# Patient Record
Sex: MALE | Race: OTHER | HISPANIC OR LATINO | URBAN - METROPOLITAN AREA
[De-identification: names, ages, dates, MRNs, and addresses within clinical notes are randomized per-mention and may not be internally consistent; named-entity substitution may affect disease eponyms.]

---

## 2022-01-17 ENCOUNTER — EMERGENCY (EMERGENCY)
Facility: HOSPITAL | Age: 47
LOS: 1 days | Discharge: HOME | End: 2022-01-17
Attending: STUDENT IN AN ORGANIZED HEALTH CARE EDUCATION/TRAINING PROGRAM
Payer: SELF-PAY

## 2022-01-17 VITALS
OXYGEN SATURATION: 99 % | TEMPERATURE: 98 F | RESPIRATION RATE: 18 BRPM | HEIGHT: 68 IN | SYSTOLIC BLOOD PRESSURE: 135 MMHG | DIASTOLIC BLOOD PRESSURE: 83 MMHG | HEART RATE: 76 BPM | WEIGHT: 179.9 LBS

## 2022-01-17 PROCEDURE — 99283 EMERGENCY DEPT VISIT LOW MDM: CPT

## 2022-01-17 RX ORDER — AZTREONAM 2 G
1 VIAL (EA) INJECTION
Qty: 20 | Refills: 0
Start: 2022-01-17 | End: 2022-01-26

## 2022-01-17 RX ORDER — ACETAMINOPHEN 500 MG
975 TABLET ORAL ONCE
Refills: 0 | Status: COMPLETED | OUTPATIENT
Start: 2022-01-17 | End: 2022-01-17

## 2022-01-17 RX ADMIN — Medication 975 MILLIGRAM(S): at 14:24

## 2022-01-17 RX ADMIN — Medication 1 TABLET(S): at 14:23

## 2022-01-17 NOTE — ED ADULT NURSE NOTE - CAS DISCH ACCOMP BY
Therapy orders received pt is planned to have surgery will hold current orders and will need new post op orders following surgical procedure. Nursing was consulted and agreed.      Self

## 2022-01-17 NOTE — ED PROVIDER NOTE - ATTENDING CONTRIBUTION TO CARE
pt p/w cellulitis  single small bump that he attempted home I&D, worsened  cellulitis of R dorsal aspect wrist  full range of motion of wrist, good pulses, no hand/finger involvement  indurated 4cm region of erythema, no fluctuance, no drainable abscess  no s/s systemic infection such as f/c, n/v otherwise  dc w abx covering MRSA, close pmd f/up and return precautions

## 2022-01-17 NOTE — ED PROVIDER NOTE - OBJECTIVE STATEMENT
, Ken, [id number]   45 y/o male coming from home, w/ no significant PMHx, complaining of infection to his right wrist since Wednesday. Reports it started on Wednesday w/ a lump. Thursday, he tried to open it and then a little bit later he started to have a second bump. Right now it is painful, has drainage, and is red and swollen. Denies fever, chills, weakness, limited ROM, numbness, or tingling. Patient denies any other symptoms. NKDA. , Ken, [id number]   47 y/o male coming from home, w/ no significant PMHx, complaining of infection to his right wrist since Wednesday. Reports it started on Wednesday w/ a single, small bump. Thursday, he tried to open it and then a little bit later he started to have a second bump. Right now it is painful, has drainage, and is red and swollen. Denies fever, chills, weakness, limited ROM, numbness, or tingling. Patient denies any other symptoms. NKDA.

## 2022-01-17 NOTE — ED PROVIDER NOTE - NSFOLLOWUPINSTRUCTIONS_ED_ALL_ED_FT
MEDICATION SENT TO YOUR PHARMACY, PLEASE TAKE IT AS IT PRESCRIBED.    YOU MAY TAKE OVER THE COUNTER PAIN MEDICATION , TYLENOL OR MOTRIN AS NEED IT. FOLLOW THE DIRECTION ON THE MEDICATION BOX.     Abscess    An abscess is an infected area that contains a collection of pus and debris. It can occur in almost any part of the body and occurs when the tissue gets infection. Symptoms include a painful mass that is red, warm, tender that might break open and HAVE drainage. If your health care provider gave you antibiotics make sure to take the full course and do not stop even if feeling better.     SEEK IMMEDIATE MEDICAL CARE IF YOU HAVE ANY OF THE FOLLOWING SYMPTOMS: chills, fever, muscle aches, or red streaking from the area.

## 2022-01-17 NOTE — ED PROVIDER NOTE - PATIENT PORTAL LINK FT
You can access the FollowMyHealth Patient Portal offered by [facility] by registering at the following website: [email or web address]. By joining [facility]’s FollowMyHealth portal, you will also be able to view your health information using other applications (apps) compatible with our system.

## 2024-11-11 NOTE — ED PROVIDER NOTE - CPE EDP ENMT NORM
Call placed to [facility] regarding consult placed for Dr. Stratton. No answer voicemail left with callback number   normal...